# Patient Record
Sex: MALE | Race: WHITE | NOT HISPANIC OR LATINO | URBAN - METROPOLITAN AREA
[De-identification: names, ages, dates, MRNs, and addresses within clinical notes are randomized per-mention and may not be internally consistent; named-entity substitution may affect disease eponyms.]

---

## 2017-11-28 ENCOUNTER — IMPORTED ENCOUNTER (OUTPATIENT)
Dept: URBAN - METROPOLITAN AREA CLINIC 38 | Facility: CLINIC | Age: 72
End: 2017-11-28

## 2017-11-28 PROBLEM — H25.813 COMBINED FORMS OF AGE-RELATED CATARACT, BILATERAL: Noted: 2017-11-28

## 2017-11-28 PROBLEM — E11.9 TYPE II DIABETES WITHOUT COMPLICATIONS: Noted: 2017-11-28

## 2017-11-28 PROBLEM — H04.123 TEAR FILM INSUFFICIENCY OF BILATERAL LACRIMAL GLANDS: Noted: 2017-11-28

## 2017-11-28 PROBLEM — H40.013 OPEN ANGLE WITH BORDERLINE FINDINGS, LOW RISK, BILATERAL: Noted: 2017-11-28

## 2017-11-28 PROCEDURE — 76514 ECHO EXAM OF EYE THICKNESS: CPT

## 2017-11-28 PROCEDURE — 92014 COMPRE OPH EXAM EST PT 1/>: CPT

## 2018-04-25 ENCOUNTER — IMPORTED ENCOUNTER (OUTPATIENT)
Dept: URBAN - METROPOLITAN AREA CLINIC 38 | Facility: CLINIC | Age: 73
End: 2018-04-25

## 2018-04-25 PROBLEM — H40.033 PRIMARY ANGLE CLOSURE SUSPECT OF BILATERAL EYE: Noted: 2018-04-25

## 2018-04-25 PROBLEM — H25.813 COMBINED FORMS OF AGE-RELATED CATARACT, BILATERAL: Noted: 2018-04-25

## 2018-04-25 PROCEDURE — 92083 EXTENDED VISUAL FIELD XM: CPT

## 2018-04-25 PROCEDURE — 92020 GONIOSCOPY: CPT

## 2018-04-25 PROCEDURE — 92012 INTRM OPH EXAM EST PATIENT: CPT

## 2018-06-15 ENCOUNTER — IMPORTED ENCOUNTER (OUTPATIENT)
Dept: URBAN - METROPOLITAN AREA CLINIC 38 | Facility: CLINIC | Age: 73
End: 2018-06-15

## 2018-06-15 PROBLEM — H40.033 PRIMARY ANGLE CLOSURE SUSPECT OF BILATERAL EYE: Noted: 2018-06-15

## 2018-06-15 PROCEDURE — 66761 REVISION OF IRIS: CPT

## 2018-08-10 ENCOUNTER — IMPORTED ENCOUNTER (OUTPATIENT)
Dept: URBAN - METROPOLITAN AREA CLINIC 38 | Facility: CLINIC | Age: 73
End: 2018-08-10

## 2018-08-10 PROBLEM — H40.033 PRIMARY ANGLE CLOSURE SUSPECT OF BILATERAL EYE: Noted: 2018-08-10

## 2018-08-10 PROCEDURE — 66761 REVISION OF IRIS: CPT

## 2018-11-20 ENCOUNTER — IMPORTED ENCOUNTER (OUTPATIENT)
Dept: URBAN - METROPOLITAN AREA CLINIC 38 | Facility: CLINIC | Age: 73
End: 2018-11-20

## 2018-11-20 PROBLEM — H25.813 COMBINED FORMS OF AGE-RELATED CATARACT, BILATERAL: Noted: 2018-11-20

## 2018-11-20 PROBLEM — H43.813 VITREOUS DEGENERATION, BILATERAL: Noted: 2018-11-20

## 2018-11-20 PROBLEM — E11.3292 TYPE 2 DIABETES WITH MILD NONPROLIFERATIVE DIABETIC RETINOPATHY WITHOUT MACULAR EDEMA OF LEFT EYE: Noted: 2018-11-20

## 2018-11-20 PROBLEM — H40.013 OPEN ANGLE WITH BORDERLINE FINDINGS, LOW RISK, BILATERAL: Noted: 2018-11-20

## 2018-11-20 PROCEDURE — 92014 COMPRE OPH EXAM EST PT 1/>: CPT

## 2018-12-04 ENCOUNTER — IMPORTED ENCOUNTER (OUTPATIENT)
Dept: URBAN - METROPOLITAN AREA CLINIC 38 | Facility: CLINIC | Age: 73
End: 2018-12-04

## 2018-12-04 PROBLEM — H25.813 COMBINED FORMS OF AGE-RELATED CATARACT, BILATERAL: Noted: 2018-12-04

## 2018-12-04 PROCEDURE — 92012 INTRM OPH EXAM EST PATIENT: CPT

## 2018-12-04 PROCEDURE — 92136 OPHTHALMIC BIOMETRY: CPT

## 2018-12-17 ENCOUNTER — IMPORTED ENCOUNTER (OUTPATIENT)
Dept: URBAN - METROPOLITAN AREA CLINIC 38 | Facility: CLINIC | Age: 73
End: 2018-12-17

## 2018-12-17 PROBLEM — H25.813 COMBINED FORMS OF AGE-RELATED CATARACT, BILATERAL: Noted: 2018-12-17

## 2018-12-17 PROCEDURE — 92136 OPHTHALMIC BIOMETRY: CPT

## 2019-02-12 ENCOUNTER — IMPORTED ENCOUNTER (OUTPATIENT)
Dept: URBAN - METROPOLITAN AREA CLINIC 38 | Facility: CLINIC | Age: 74
End: 2019-02-12

## 2019-02-12 PROCEDURE — 66984 XCAPSL CTRC RMVL W/O ECP: CPT

## 2019-02-13 ENCOUNTER — IMPORTED ENCOUNTER (OUTPATIENT)
Dept: URBAN - METROPOLITAN AREA CLINIC 38 | Facility: CLINIC | Age: 74
End: 2019-02-13

## 2019-02-25 ENCOUNTER — IMPORTED ENCOUNTER (OUTPATIENT)
Dept: URBAN - METROPOLITAN AREA CLINIC 38 | Facility: CLINIC | Age: 74
End: 2019-02-25

## 2019-06-04 ENCOUNTER — IMPORTED ENCOUNTER (OUTPATIENT)
Dept: URBAN - METROPOLITAN AREA CLINIC 38 | Facility: CLINIC | Age: 74
End: 2019-06-04

## 2019-06-04 PROBLEM — H43.813 VITREOUS DEGENERATION, BILATERAL: Noted: 2019-06-04

## 2019-06-04 PROBLEM — H26.492 POSTERIOR CAPSULE OPACITY -   OS: Noted: 2019-06-04

## 2019-06-04 PROBLEM — H25.811 COMBINED FORMS OF AGE-RELATED CATARACT, RIGHT EYE: Noted: 2019-06-04

## 2019-06-04 PROBLEM — H40.013 OPEN ANGLE WITH BORDERLINE FINDINGS, LOW RISK, BILATERAL: Noted: 2019-06-04

## 2019-06-04 PROBLEM — E11.3292 TYPE 2 DIABETES WITH MILD NONPROLIFERATIVE DIABETIC RETINOPATHY WITHOUT MACULAR EDEMA OF LEFT EYE: Noted: 2019-06-04

## 2019-06-04 PROCEDURE — 92012 INTRM OPH EXAM EST PATIENT: CPT

## 2019-09-12 ENCOUNTER — IMPORTED ENCOUNTER (OUTPATIENT)
Dept: URBAN - METROPOLITAN AREA CLINIC 38 | Facility: CLINIC | Age: 74
End: 2019-09-12

## 2019-09-12 PROBLEM — E11.9 TYPE II DIABETES WITHOUT COMPLICATIONS: Noted: 2019-09-12

## 2019-09-12 PROBLEM — H25.11 NUCLEAR SCLEROSIS CATARACT OF RIGHT EYE: Noted: 2019-09-12

## 2019-09-12 PROBLEM — H43.813 VITREOUS DEGENERATION, BILATERAL: Noted: 2019-09-12

## 2019-09-12 PROBLEM — H26.492 POSTERIOR CAPSULE OPACITY -   OS: Noted: 2019-09-12

## 2019-09-12 PROCEDURE — 92012 INTRM OPH EXAM EST PATIENT: CPT

## 2019-12-04 ENCOUNTER — IMPORTED ENCOUNTER (OUTPATIENT)
Dept: URBAN - METROPOLITAN AREA CLINIC 38 | Facility: CLINIC | Age: 74
End: 2019-12-04

## 2019-12-04 PROBLEM — H40.013 OPEN ANGLE WITH BORDERLINE FINDINGS, LOW RISK, BILATERAL: Noted: 2019-12-04

## 2019-12-04 PROCEDURE — 92012 INTRM OPH EXAM EST PATIENT: CPT

## 2020-01-15 ENCOUNTER — IMPORTED ENCOUNTER (OUTPATIENT)
Dept: URBAN - METROPOLITAN AREA CLINIC 38 | Facility: CLINIC | Age: 75
End: 2020-01-15

## 2020-01-15 PROBLEM — H40.013 OPEN ANGLE WITH BORDERLINE FINDINGS, LOW RISK, BILATERAL: Noted: 2020-01-15

## 2020-01-15 PROCEDURE — 92012 INTRM OPH EXAM EST PATIENT: CPT

## 2020-01-15 PROCEDURE — 92083 EXTENDED VISUAL FIELD XM: CPT

## 2020-06-09 ENCOUNTER — IMPORTED ENCOUNTER (OUTPATIENT)
Dept: URBAN - METROPOLITAN AREA CLINIC 38 | Facility: CLINIC | Age: 75
End: 2020-06-09

## 2020-09-22 ENCOUNTER — IMPORTED ENCOUNTER (OUTPATIENT)
Dept: URBAN - METROPOLITAN AREA CLINIC 38 | Facility: CLINIC | Age: 75
End: 2020-09-22

## 2020-09-22 PROBLEM — E11.3291 TYPE 2 DIABETES W/ MILD NONPROLIFERATIVE DIABETIC RETINOPATHY W/O MACULAR EDEMA OF RIGHT EYE: Noted: 2020-09-22

## 2020-09-22 PROBLEM — H26.492 POSTERIOR CAPSULE OPACITY -   OS: Noted: 2020-09-22

## 2020-09-22 PROBLEM — H25.811 COMBINED FORMS OF AGE-RELATED CATARACT, RIGHT EYE: Noted: 2020-09-22

## 2020-09-22 PROBLEM — H40.013 OPEN ANGLE WITH BORDERLINE FINDINGS, LOW RISK, BILATERAL: Noted: 2020-09-22

## 2020-09-22 PROCEDURE — 92015 DETERMINE REFRACTIVE STATE: CPT

## 2020-09-22 PROCEDURE — 92014 COMPRE OPH EXAM EST PT 1/>: CPT

## 2020-09-22 PROCEDURE — 92020 GONIOSCOPY: CPT

## 2021-05-05 ENCOUNTER — IMPORTED ENCOUNTER (OUTPATIENT)
Dept: URBAN - METROPOLITAN AREA CLINIC 38 | Facility: CLINIC | Age: 76
End: 2021-05-05

## 2021-05-05 PROBLEM — G43.809 OTHER MIGRAINE, NOT INTRACTABLE, W/O STATUS MIGRAINOSUS: Noted: 2021-05-05

## 2021-05-05 PROBLEM — H40.013 OPEN ANGLE WITH BORDERLINE FINDINGS, LOW RISK, BILATERAL: Noted: 2021-05-05

## 2021-05-05 PROCEDURE — 92012 INTRM OPH EXAM EST PATIENT: CPT

## 2021-05-05 PROCEDURE — 92020 GONIOSCOPY: CPT

## 2021-05-05 PROCEDURE — 92083 EXTENDED VISUAL FIELD XM: CPT

## 2021-06-10 ENCOUNTER — OFFICE VISIT (OUTPATIENT)
Dept: ORTHOPEDICS | Facility: CLINIC | Age: 76
End: 2021-06-10
Payer: COMMERCIAL

## 2021-06-10 VITALS — BODY MASS INDEX: 25.33 KG/M2 | WEIGHT: 208 LBS | HEIGHT: 76 IN

## 2021-06-10 DIAGNOSIS — M47.816 LUMBAR FACET ARTHROPATHY: ICD-10-CM

## 2021-06-10 DIAGNOSIS — M54.16 LUMBAR RADICULOPATHY: ICD-10-CM

## 2021-06-10 DIAGNOSIS — M48.062 LUMBAR STENOSIS WITH NEUROGENIC CLAUDICATION: Primary | ICD-10-CM

## 2021-06-10 PROCEDURE — 3008F BODY MASS INDEX DOCD: CPT | Performed by: ORTHOPAEDIC SURGERY

## 2021-06-10 PROCEDURE — 99204 OFFICE O/P NEW MOD 45 MIN: CPT | Performed by: ORTHOPAEDIC SURGERY

## 2021-06-10 NOTE — PROGRESS NOTES
"ORTHOSPINE H&P  Admitting Diagnosis:  No admission diagnoses are documented for this encounter.      CHIEF COMPLAINT : 1.  Right buttock pain  2.  Right lateral leg pain  3.  Right lateral extremity numbness and weakness  4.  Right foot pain    HPI :     Patient is a 75 y.o. male who presents with diffuse symptoms predominantly right-sided 2 to 3 years duration present before his recent hip replacement of 10 weeks ago.    Has had no treatment to date.    Reports previous lumbar surgery L4-L5 April 2013 right SI joint fusion August 2015.    Symptoms reported to be aching.  Pain diagram currently supports objective complaints.    Describe 6% pain in the leg 40% low back.    Pain is worse with standing sitting and walking.    Ambulatory capacity half mile.    Pain is improved when he lies down.    Uses a cane for ambulation.     Medical History: No past medical history on file.    Surgical History: No past surgical history on file.    Social History:   Social History     Social History Narrative   • Not on file       Family History: No family history on file.    Allergies: Patient has no known allergies.      Review of Systems  All other systems reviewed and negative except as noted in the HPI.    Objective       Physical Exam :   Visit Vitals  Ht 1.93 m (6' 4\")   Wt 94.3 kg (208 lb)   BMI 25.32 kg/m²     Alert and oriented to person time and place.  Posture is neutral.  Gait is with use of a cane.  Neurologic exam is nonfocal.      Imaging : Personal review MRI scan lumbar spine notes previous laminectomy surgery L4-5 no instability is noted.  Adjacent segment stenosis L2-3 L3-L4 related to posterior element hypertrophic changes most notably facet joint arthropathy bilaterally at the L2-L3 L3-L4 level.  No coronal or sagittal instability is noted.    MRI report reviewed      IMPRESSION : 1.  Lumbar spinal stenosis L2-3 L3-4 with right lower extremity radiculopathy  2.  Lumbar facet arthropathy L2-3 L3-L4  3.  Status " post lumbar laminectomy L4-L5    PLAN : Treatment options reviewed including the role of minimally invasive options by way of a selective nerve root injection as well as physical therapy.    Considering his recent hip replacement surgery of 10 weeks I did discuss the role of a physical therapy course pulmonary only.    He will be reevaluated accordingly to determine his response to physical therapy.    We did discuss the role of a decompressive laminectomy and outlined the risks and benefits in brief very similar to his previous experience at the L4-L5 level.        Lino Fatima MD  6/10/2021  11:01 AM

## 2021-10-11 ENCOUNTER — IMPORTED ENCOUNTER (OUTPATIENT)
Dept: URBAN - METROPOLITAN AREA CLINIC 38 | Facility: CLINIC | Age: 76
End: 2021-10-11

## 2021-10-11 PROBLEM — E11.3293 TYPE 2 DIABETES WITH MILD NONPROLIFERATIVE DIABETIC RETINOPATHY WITHOUT MACULAR EDEMA OF BILATERAL EYES: Noted: 2021-10-11

## 2021-10-11 PROBLEM — H26.492 POSTERIOR CAPSULE OPACITY -   OS: Noted: 2021-10-11

## 2021-10-11 PROBLEM — H25.811 COMBINED FORMS OF AGE-RELATED CATARACT, RIGHT EYE: Noted: 2021-10-11

## 2021-10-11 PROBLEM — H40.013 OPEN ANGLE WITH BORDERLINE FINDINGS, LOW RISK, BILATERAL: Noted: 2021-10-11

## 2021-10-11 PROCEDURE — 92014 COMPRE OPH EXAM EST PT 1/>: CPT

## 2021-10-12 ENCOUNTER — OFFICE VISIT (OUTPATIENT)
Dept: ORTHOPEDICS | Facility: CLINIC | Age: 76
End: 2021-10-12
Payer: COMMERCIAL

## 2021-10-12 VITALS — BODY MASS INDEX: 25.33 KG/M2 | WEIGHT: 208 LBS | HEIGHT: 76 IN

## 2021-10-12 DIAGNOSIS — M47.816 LUMBAR FACET ARTHROPATHY: ICD-10-CM

## 2021-10-12 DIAGNOSIS — M48.061 LUMBAR STENOSIS WITHOUT NEUROGENIC CLAUDICATION: ICD-10-CM

## 2021-10-12 DIAGNOSIS — M51.369 LUMBAR DEGENERATIVE DISC DISEASE: Primary | ICD-10-CM

## 2021-10-12 PROCEDURE — 99214 OFFICE O/P EST MOD 30 MIN: CPT | Performed by: ORTHOPAEDIC SURGERY

## 2021-10-12 PROCEDURE — 3008F BODY MASS INDEX DOCD: CPT | Performed by: ORTHOPAEDIC SURGERY

## 2021-10-12 NOTE — PROGRESS NOTES
"ORTHO SPINE ESTABLISHED PATIENT      Raymond Sanchez       HPI: Returns notes improvement in his complaints in terms of his lower extremity pain complaints which are absent.    Notes predominantly lower back and right buttock pain.    Currently has no neuropathic symptomatology    PHYSICAL EXAM :   Visit Vitals  Ht 1.93 m (6' 4\")   Wt 94.3 kg (208 lb)   BMI 25.32 kg/m²       Posture is neutral.  Neurologic exam is nonfocal.  Provocative radicular testing absent.  Hip and knee range of motion did not elicit any pain complaints      Imaging : Personal view MRI scan lumbar spine notes multilevel lumbar disc disease and multilevel lumbar facet arthropathy with varying degrees of lumbar stenosis moderate to severe at the L4-L5 and moderate at the L3-L4 level.  No associated instability is noted.  Multilevel disc degenerative changes are noted.    MRI report reviewed    IMPRESSION : 1.  Lumbar multilevel disc disease  2.  Multilevel lumbar facet arthropathy  3.  Lumbar spinal stenosis moderate to severe L3-4 L4-5    PLAN : Treatment options were discussed including the role of spine surgery and outcome specific to his presurgical complaints.    At this point based on his clinical presentation subjective improvement I do not feel spinal surgical intervention is a viable option at this point.  I do feel he would be best served with observation rather than surgical intervention.    The specifics of surgery were in fact outlined and he will be followed accordingly pending his response to noninvasive treatment.      Lino Fatima MD   10/12/2021  11:27 AM  "

## 2022-02-07 ENCOUNTER — TELEPHONE (OUTPATIENT)
Dept: INTERNAL MEDICINE | Facility: CLINIC | Age: 77
End: 2022-02-07
Payer: COMMERCIAL

## 2022-04-11 ENCOUNTER — IMPORTED ENCOUNTER (OUTPATIENT)
Dept: URBAN - METROPOLITAN AREA CLINIC 38 | Facility: CLINIC | Age: 77
End: 2022-04-11

## 2022-04-11 PROBLEM — H40.013: Noted: 2022-04-11

## 2022-04-11 PROBLEM — H40.013 GLAUCOMA SUSPECT: Noted: 2022-04-11

## 2022-04-11 PROBLEM — H40.013 OPEN ANGLE WITH BORDERLINE FINDINGS, LOW RISK, BILATERAL: Noted: 2022-04-11

## 2022-04-11 PROCEDURE — 92020 GONIOSCOPY: CPT

## 2022-04-11 PROCEDURE — 92083 EXTENDED VISUAL FIELD XM: CPT

## 2022-04-11 PROCEDURE — 92012 INTRM OPH EXAM EST PATIENT: CPT

## 2022-06-04 ASSESSMENT — TONOMETRY
OS_IOP_MMHG: 10
OD_IOP_MMHG: 11
OS_IOP_MMHG: 9
OS_IOP_MMHG: 12
OS_IOP_MMHG: 15
OD_IOP_MMHG: 15
OD_IOP_MMHG: 16
OS_IOP_MMHG: 14
OD_IOP_MMHG: 15
OD_IOP_MMHG: 18
OD_IOP_MMHG: 10
OS_IOP_MMHG: 19
OS_IOP_MMHG: 13
OD_IOP_MMHG: 9
OS_IOP_MMHG: 15
OD_IOP_MMHG: 9
OD_IOP_MMHG: 17
OD_IOP_MMHG: 14
OD_IOP_MMHG: 11
OD_IOP_MMHG: 12
OD_IOP_MMHG: 9
OS_IOP_MMHG: 9
OS_IOP_MMHG: 11
OD_IOP_MMHG: 13
OS_IOP_MMHG: 12
OS_IOP_MMHG: 19
OS_IOP_MMHG: 7
OD_IOP_MMHG: 13
OS_IOP_MMHG: 8
OD_IOP_MMHG: 8
OD_IOP_MMHG: 10
OD_IOP_MMHG: 10
OS_IOP_MMHG: 10
OD_IOP_MMHG: 9
OD_IOP_MMHG: 13
OS_IOP_MMHG: 9
OS_IOP_MMHG: 14
OS_IOP_MMHG: 10
OS_IOP_MMHG: 7
OD_IOP_MMHG: 12
OS_IOP_MMHG: 13
OS_IOP_MMHG: 9
OD_IOP_MMHG: 14
OD_IOP_MMHG: 10
OD_IOP_MMHG: 11
OS_IOP_MMHG: 10
OS_IOP_MMHG: 9
OS_IOP_MMHG: 11
OS_IOP_MMHG: 9
OD_IOP_MMHG: 13

## 2022-06-04 ASSESSMENT — KERATOMETRY
OS_AXISANGLE_DEGREES: 170
OS_K2POWER_DIOPTERS: 45.00
OD_K2POWER_DIOPTERS: 43.25
OD_K1POWER_DIOPTERS: 44.75
OD_K2POWER_DIOPTERS: 43.00
OS_K1POWER_DIOPTERS: 42.75
OD_K2POWER_DIOPTERS: 45.25
OS_AXISANGLE_DEGREES: 180
OD_K1POWER_DIOPTERS: 43.00
OS_AXISANGLE2_DEGREES: 180
OD_AXISANGLE2_DEGREES: 85
OD_AXISANGLE_DEGREES: 80
OS_AXISANGLE2_DEGREES: 90
OD_K1POWER_DIOPTERS: 45.00
OS_K1POWER_DIOPTERS: 45.25
OD_K1POWER_DIOPTERS: 43.00
OS_AXISANGLE_DEGREES: 180
OD_K1POWER_DIOPTERS: 45.25
OS_K2POWER_DIOPTERS: 45.00
OS_AXISANGLE_DEGREES: 86
OD_AXISANGLE_DEGREES: 77
OD_AXISANGLE_DEGREES: 170
OD_AXISANGLE_DEGREES: 165
OS_AXISANGLE2_DEGREES: 85
OS_K2POWER_DIOPTERS: 43.50
OS_AXISANGLE2_DEGREES: 110
OD_AXISANGLE2_DEGREES: 75
OD_AXISANGLE_DEGREES: 165
OS_AXISANGLE_DEGREES: 180
OS_AXISANGLE_DEGREES: 179
OS_AXISANGLE_DEGREES: 5
OS_K2POWER_DIOPTERS: 44.75
OD_K1POWER_DIOPTERS: 45.00
OS_K2POWER_DIOPTERS: 43.25
OD_AXISANGLE2_DEGREES: 166
OS_K2POWER_DIOPTERS: 43.50
OS_AXISANGLE2_DEGREES: 90
OD_AXISANGLE2_DEGREES: 80
OS_AXISANGLE2_DEGREES: 90
OS_AXISANGLE2_DEGREES: 85
OD_K2POWER_DIOPTERS: 45.00
OD_K1POWER_DIOPTERS: 43.00
OD_K2POWER_DIOPTERS: 43.25
OS_K2POWER_DIOPTERS: 44.25
OD_AXISANGLE2_DEGREES: 167
OS_AXISANGLE_DEGREES: 83
OS_AXISANGLE_DEGREES: 90
OS_AXISANGLE2_DEGREES: 180
OS_AXISANGLE_DEGREES: 175
OD_K2POWER_DIOPTERS: 43.00
OD_AXISANGLE_DEGREES: 165
OD_AXISANGLE_DEGREES: 175
OD_AXISANGLE2_DEGREES: 75
OS_K1POWER_DIOPTERS: 42.50
OD_K2POWER_DIOPTERS: 43.50
OS_K2POWER_DIOPTERS: 43.50
OD_AXISANGLE2_DEGREES: 168
OS_K1POWER_DIOPTERS: 44.50
OS_K1POWER_DIOPTERS: 44.75
OS_K1POWER_DIOPTERS: 44.50
OS_K1POWER_DIOPTERS: 44.75
OD_AXISANGLE_DEGREES: 76
OD_K2POWER_DIOPTERS: 43.50
OD_K1POWER_DIOPTERS: 45.00
OS_K1POWER_DIOPTERS: 42.25
OD_AXISANGLE2_DEGREES: 75
OS_K2POWER_DIOPTERS: 43.50
OS_AXISANGLE2_DEGREES: 176
OS_K1POWER_DIOPTERS: 45.00
OD_K1POWER_DIOPTERS: 45.25
OD_AXISANGLE_DEGREES: 165
OS_AXISANGLE_DEGREES: 90
OD_K1POWER_DIOPTERS: 44.75
OS_AXISANGLE2_DEGREES: 95
OS_AXISANGLE2_DEGREES: 89
OD_K2POWER_DIOPTERS: 45.00
OS_K2POWER_DIOPTERS: 44.00
OD_K2POWER_DIOPTERS: 43.25
OD_AXISANGLE2_DEGREES: 70
OS_K2POWER_DIOPTERS: 43.50
OS_K1POWER_DIOPTERS: 44.75
OD_K1POWER_DIOPTERS: 43.00
OS_AXISANGLE_DEGREES: 175
OD_AXISANGLE_DEGREES: 160
OD_AXISANGLE_DEGREES: 78
OS_AXISANGLE2_DEGREES: 80
OS_K1POWER_DIOPTERS: 44.50
OD_AXISANGLE2_DEGREES: 75
OD_AXISANGLE2_DEGREES: 75
OS_K2POWER_DIOPTERS: 44.75
OS_AXISANGLE_DEGREES: 20
OS_K1POWER_DIOPTERS: 42.50
OD_K2POWER_DIOPTERS: 45.00
OD_AXISANGLE_DEGREES: 165
OD_K1POWER_DIOPTERS: 44.75
OS_AXISANGLE2_DEGREES: 173
OD_K2POWER_DIOPTERS: 42.50
OS_K2POWER_DIOPTERS: 44.00
OD_AXISANGLE2_DEGREES: 170
OS_K1POWER_DIOPTERS: 44.75

## 2022-06-04 ASSESSMENT — VISUAL ACUITY
OD_CC: J1
OD_CC: J1
OS_SC: 20/25-1
OD_SC: 20/30
OS_BAT: 20/50-
OS_CC: J1
OS_CC: J1
OD_BAT: 20/30-
OS_SC: 20/30
OS_CC: J1
OS_SC: J3
OS_SC: 20/25
OD_CC: J1
OD_SC: 20/30-
OD_BAT: 20/200
OD_CC: J1
OD_CC: J1
OS_SC: 20/25-1
OD_CC: 20/30
OS_BAT: 20/50-
OS_CC: 20/20
OD_CC: J1
OS_CC: J1
OD_SC: J4
OS_SC: 20/25-1
OD_CC: 20/30
OD_SC: 20/30
OS_SC: 20/30-1
OD_SC: 20/25-1
OD_SC: J7
OD_SC: 20/25-1
OS_CC: J2
OS_SC: 20/25
OS_SC: 20/25
OS_CC: J1
OD_SC: 20/30-1
OS_SC: 20/20-1
OD_CC: 20/30-1
OS_CC: 20/25-1
OD_CC: J1
OD_CC: J1
OS_CC: J1
OD_CC: J1
OS_BAT: 20/30-
OD_BAT: 20/40
OD_CC: J1
OS_CC: J1
OS_SC: 20/25-1
OD_CC: 20/30+2
OS_CC: 20/25-1
OS_CC: 20/25-1
OD_SC: 20/30-1
OD_BAT: 20/50-
OS_BAT: 20/40
OD_SC: 20/40-1
OD_BAT: 20/40-
OS_SC: 20/25-1
OS_SC: J4
OS_CC: J3
OS_BAT: 20/60
OS_CC: J1
OD_SC: 20/30-1
OD_SC: 20/30
OD_SC: 20/25-1
OS_SC: 20/25-1
OS_SC: J3
OD_SC: 20/30

## 2022-06-04 ASSESSMENT — PACHYMETRY
OS_CT_UM: C:  FINAL: 492.000; P:
OD_CT_UM: C:  FINAL: 491.000; P:  IN: 0.000
OD_CT_UM: C:  FINAL: 491.000; P:  IN: 0.000
OS_CT_UM: C:  FINAL: 492.000; P:
OS_CT_UM: C:  FINAL: 492.000; P:
OD_CT_UM: C:  FINAL: 491.000; P:  IN: 0.000
OS_CT_UM: C:  FINAL: 492.000; P:
OD_CT_UM: C:  FINAL: 491.000; P:  IN: 0.000
OS_CT_UM: C:  FINAL: 492.000; P:  IN: 0.000
OD_CT_UM: C:  FINAL: 491.000; P:  IN: 0.000

## 2022-10-10 ENCOUNTER — ESTABLISHED COMPREHENSIVE EXAM (OUTPATIENT)
Dept: URBAN - METROPOLITAN AREA CLINIC 68 | Facility: CLINIC | Age: 77
End: 2022-10-10

## 2022-10-10 DIAGNOSIS — E11.3493: ICD-10-CM

## 2022-10-10 DIAGNOSIS — H40.013: ICD-10-CM

## 2022-10-10 DIAGNOSIS — H04.123: ICD-10-CM

## 2022-10-10 DIAGNOSIS — H25.811: ICD-10-CM

## 2022-10-10 DIAGNOSIS — H26.492: ICD-10-CM

## 2022-10-10 PROCEDURE — 92133 CPTRZD OPH DX IMG PST SGM ON: CPT

## 2022-10-10 PROCEDURE — 92014 COMPRE OPH EXAM EST PT 1/>: CPT

## 2022-10-10 ASSESSMENT — KERATOMETRY
OD_AXISANGLE_DEGREES: 071
OS_K1POWER_DIOPTERS: 42.25
OS_AXISANGLE2_DEGREES: 174
OS_AXISANGLE_DEGREES: 084
OD_K1POWER_DIOPTERS: 43.25
OD_AXISANGLE2_DEGREES: 161
OD_K2POWER_DIOPTERS: 45.00
OS_K2POWER_DIOPTERS: 44.50

## 2022-10-10 ASSESSMENT — VISUAL ACUITY
OD_SC: 20/20
OD_GLARE: 20/40
OS_GLARE: 20/20
OS_CC: J1
OD_CC: J1
OS_SC: 20/20

## 2022-10-10 ASSESSMENT — TONOMETRY
OD_IOP_MMHG: 15
OD_IOP_MMHG: 18
OS_IOP_MMHG: 13
OS_IOP_MMHG: 16

## 2023-04-12 ENCOUNTER — ESTABLISHED COMPREHENSIVE EXAM (OUTPATIENT)
Dept: URBAN - METROPOLITAN AREA CLINIC 68 | Facility: CLINIC | Age: 78
End: 2023-04-12

## 2023-04-12 DIAGNOSIS — H26.492: ICD-10-CM

## 2023-04-12 DIAGNOSIS — H40.013: ICD-10-CM

## 2023-04-12 DIAGNOSIS — H25.811: ICD-10-CM

## 2023-04-12 DIAGNOSIS — E11.3492: ICD-10-CM

## 2023-04-12 PROCEDURE — 92083 EXTENDED VISUAL FIELD XM: CPT

## 2023-04-12 PROCEDURE — 92014 COMPRE OPH EXAM EST PT 1/>: CPT

## 2023-04-12 PROCEDURE — 92020 GONIOSCOPY: CPT

## 2023-04-12 ASSESSMENT — KERATOMETRY
OD_AXISANGLE2_DEGREES: 161
OS_AXISANGLE_DEGREES: 084
OS_AXISANGLE2_DEGREES: 174
OD_AXISANGLE_DEGREES: 071
OS_K1POWER_DIOPTERS: 42.25
OD_K2POWER_DIOPTERS: 45.00
OS_K2POWER_DIOPTERS: 44.50
OD_K1POWER_DIOPTERS: 43.25

## 2023-04-12 ASSESSMENT — VISUAL ACUITY
OD_CC: J1
OD_SC: 20/25
OS_SC: 20/20-1
OD_GLARE: 20/40
OS_GLARE: 20/40-1
OS_CC: J1

## 2023-04-12 ASSESSMENT — TONOMETRY
OD_IOP_MMHG: 8
OS_IOP_MMHG: 12
OD_IOP_MMHG: 16
OS_IOP_MMHG: 8

## 2023-11-13 ENCOUNTER — FOLLOW UP (OUTPATIENT)
Dept: URBAN - METROPOLITAN AREA CLINIC 68 | Facility: CLINIC | Age: 78
End: 2023-11-13

## 2023-11-13 DIAGNOSIS — H40.013: ICD-10-CM

## 2023-11-13 PROCEDURE — 92133 CPTRZD OPH DX IMG PST SGM ON: CPT

## 2023-11-13 PROCEDURE — 92012 INTRM OPH EXAM EST PATIENT: CPT

## 2023-11-13 ASSESSMENT — KERATOMETRY
OD_AXISANGLE_DEGREES: 071
OS_K2POWER_DIOPTERS: 44.50
OS_K1POWER_DIOPTERS: 42.25
OS_AXISANGLE_DEGREES: 084
OD_AXISANGLE2_DEGREES: 161
OS_AXISANGLE2_DEGREES: 174
OD_K1POWER_DIOPTERS: 43.25
OD_K2POWER_DIOPTERS: 45.00

## 2023-11-13 ASSESSMENT — VISUAL ACUITY
OS_SC: 20/25
OD_SC: 20/25-1

## 2023-11-13 ASSESSMENT — TONOMETRY
OD_IOP_MMHG: 13
OD_IOP_MMHG: 10
OS_IOP_MMHG: 13
OS_IOP_MMHG: 10

## 2024-06-17 ENCOUNTER — ESTABLISHED COMPREHENSIVE EXAM (OUTPATIENT)
Dept: URBAN - METROPOLITAN AREA CLINIC 68 | Facility: CLINIC | Age: 79
End: 2024-06-17

## 2024-06-17 DIAGNOSIS — H04.123: ICD-10-CM

## 2024-06-17 DIAGNOSIS — H25.811: ICD-10-CM

## 2024-06-17 DIAGNOSIS — H26.492: ICD-10-CM

## 2024-06-17 DIAGNOSIS — H40.013: ICD-10-CM

## 2024-06-17 PROCEDURE — 92020 GONIOSCOPY: CPT

## 2024-06-17 PROCEDURE — 92014 COMPRE OPH EXAM EST PT 1/>: CPT

## 2024-06-17 PROCEDURE — 92083 EXTENDED VISUAL FIELD XM: CPT

## 2024-06-17 ASSESSMENT — VISUAL ACUITY
OD_SC: 20/30-1
OS_SC: J2
OS_GLARE: 20/40
OD_SC: J1
OS_SC: 20/25-1
OD_GLARE: 20/50

## 2024-06-17 ASSESSMENT — KERATOMETRY
OD_AXISANGLE_DEGREES: 75
OS_AXISANGLE2_DEGREES: 4
OD_K1POWER_DIOPTERS: 43.25
OS_K2POWER_DIOPTERS: 43.75
OD_AXISANGLE2_DEGREES: 165
OS_AXISANGLE_DEGREES: 94
OS_K1POWER_DIOPTERS: 41.25
OD_K2POWER_DIOPTERS: 45.25

## 2024-06-17 ASSESSMENT — TONOMETRY
OS_IOP_MMHG: 16
OS_IOP_MMHG: 11
OD_IOP_MMHG: 19
OD_IOP_MMHG: 10

## 2024-12-16 ENCOUNTER — FOLLOW UP (OUTPATIENT)
Dept: URBAN - METROPOLITAN AREA CLINIC 68 | Facility: CLINIC | Age: 79
End: 2024-12-16

## 2024-12-16 DIAGNOSIS — E11.9: ICD-10-CM

## 2024-12-16 DIAGNOSIS — H40.013: ICD-10-CM

## 2024-12-16 PROCEDURE — 92133 CPTRZD OPH DX IMG PST SGM ON: CPT

## 2024-12-16 PROCEDURE — 99214 OFFICE O/P EST MOD 30 MIN: CPT

## 2024-12-16 ASSESSMENT — VISUAL ACUITY
OS_GLARE: 20/50
OD_PH: 20/20
OD_GLARE: 20/100
OD_SC: 20/80+1
OS_SC: 20/25-2

## 2024-12-16 ASSESSMENT — KERATOMETRY
OD_AXISANGLE2_DEGREES: 165
OD_AXISANGLE_DEGREES: 75
OS_AXISANGLE2_DEGREES: 4
OD_K2POWER_DIOPTERS: 45.25
OS_K1POWER_DIOPTERS: 41.25
OD_K1POWER_DIOPTERS: 43.25
OS_K2POWER_DIOPTERS: 43.75
OS_AXISANGLE_DEGREES: 94

## 2024-12-16 ASSESSMENT — TONOMETRY
OS_IOP_MMHG: 14
OD_IOP_MMHG: 16
OD_IOP_MMHG: 11
OS_IOP_MMHG: 11

## 2025-01-24 ENCOUNTER — CONSULTATION (OUTPATIENT)
Dept: URBAN - METROPOLITAN AREA CLINIC 68 | Facility: CLINIC | Age: 80
End: 2025-01-24

## 2025-01-24 DIAGNOSIS — H25.811: ICD-10-CM

## 2025-01-24 DIAGNOSIS — H26.492: ICD-10-CM

## 2025-01-24 PROCEDURE — 99214 OFFICE O/P EST MOD 30 MIN: CPT

## 2025-01-24 PROCEDURE — 92136 OPHTHALMIC BIOMETRY: CPT

## 2025-01-24 PROCEDURE — 92134 CPTRZ OPH DX IMG PST SGM RTA: CPT | Mod: NC

## 2025-01-24 PROCEDURE — 92025 CPTRIZED CORNEAL TOPOGRAPHY: CPT | Mod: NC

## 2025-01-24 ASSESSMENT — KERATOMETRY
OS_K1POWER_DIOPTERS: 41.25
OS_K2POWER_DIOPTERS: 43.75
OS_AXISANGLE2_DEGREES: 4
OD_K2POWER_DIOPTERS: 45.25
OD_K1POWER_DIOPTERS: 43.25
OS_AXISANGLE_DEGREES: 94
OD_AXISANGLE_DEGREES: 75
OD_AXISANGLE2_DEGREES: 165

## 2025-01-24 ASSESSMENT — VISUAL ACUITY
OD_PH: 20/20
OS_SC: 20/25
OD_GLARE: 20/100
OD_SC: J1
OD_SC: 20/70

## 2025-01-24 ASSESSMENT — TONOMETRY
OD_IOP_MMHG: 11
OS_IOP_MMHG: 10

## 2025-04-30 ENCOUNTER — 1 DAY POST-OP (OUTPATIENT)
Dept: URBAN - METROPOLITAN AREA CLINIC 68 | Facility: CLINIC | Age: 80
End: 2025-04-30

## 2025-04-30 DIAGNOSIS — Z96.1: ICD-10-CM

## 2025-04-30 PROCEDURE — 99024 POSTOP FOLLOW-UP VISIT: CPT

## 2025-04-30 ASSESSMENT — KERATOMETRY
OS_AXISANGLE2_DEGREES: 4
OS_K1POWER_DIOPTERS: 41.25
OD_K1POWER_DIOPTERS: 43.25
OS_AXISANGLE_DEGREES: 94
OS_K2POWER_DIOPTERS: 43.75
OD_AXISANGLE_DEGREES: 75
OD_AXISANGLE2_DEGREES: 165
OD_K2POWER_DIOPTERS: 45.25

## 2025-04-30 ASSESSMENT — TONOMETRY
OD_IOP_MMHG: 18
OD_IOP_MMHG: 13
OS_IOP_MMHG: 11

## 2025-04-30 ASSESSMENT — VISUAL ACUITY
OD_SC: 20/20
OS_SC: 20/25+1

## 2025-05-14 ENCOUNTER — 2 WEEK POST-OP (OUTPATIENT)
Dept: URBAN - METROPOLITAN AREA CLINIC 68 | Facility: CLINIC | Age: 80
End: 2025-05-14

## 2025-05-14 DIAGNOSIS — Z96.1: ICD-10-CM

## 2025-05-14 PROCEDURE — 99024 POSTOP FOLLOW-UP VISIT: CPT

## 2025-05-14 ASSESSMENT — VISUAL ACUITY
OS_SC: 20/30-1
OD_SC: 20/30
OS_GLARE: 20/50

## 2025-05-14 ASSESSMENT — TONOMETRY
OS_IOP_MMHG: 12
OD_IOP_MMHG: 10

## 2025-05-14 ASSESSMENT — KERATOMETRY
OS_AXISANGLE_DEGREES: 94
OD_AXISANGLE2_DEGREES: 165
OD_K1POWER_DIOPTERS: 43.25
OS_AXISANGLE2_DEGREES: 4
OS_K2POWER_DIOPTERS: 43.75
OS_K1POWER_DIOPTERS: 41.25
OD_AXISANGLE_DEGREES: 75
OD_K2POWER_DIOPTERS: 45.25

## 2025-08-13 ENCOUNTER — PROCEDURE ONLY (OUTPATIENT)
Dept: URBAN - METROPOLITAN AREA CLINIC 68 | Facility: CLINIC | Age: 80
End: 2025-08-13

## 2025-08-13 DIAGNOSIS — H26.492: ICD-10-CM

## 2025-08-13 DIAGNOSIS — H43.813: ICD-10-CM

## 2025-08-13 PROCEDURE — 66821 AFTER CATARACT LASER SURGERY: CPT

## 2025-08-13 PROCEDURE — 92012 INTRM OPH EXAM EST PATIENT: CPT | Mod: 25

## 2025-08-13 ASSESSMENT — KERATOMETRY
OS_AXISANGLE_DEGREES: 94
OS_AXISANGLE2_DEGREES: 4
OD_K1POWER_DIOPTERS: 43.25
OD_AXISANGLE2_DEGREES: 165
OD_AXISANGLE_DEGREES: 75
OS_K2POWER_DIOPTERS: 43.75
OD_K2POWER_DIOPTERS: 45.25
OS_K1POWER_DIOPTERS: 41.25

## 2025-08-13 ASSESSMENT — VISUAL ACUITY
OS_GLARE: 20/50
OD_SC: J5
OS_SC: 20/20
OD_SC: 20/40-1
OD_PH: 20/20
OS_SC: J1

## 2025-08-13 ASSESSMENT — TONOMETRY
OS_IOP_MMHG: 10
OD_IOP_MMHG: 10

## 2025-08-21 ENCOUNTER — CONSULTATION (OUTPATIENT)
Dept: URBAN - METROPOLITAN AREA CLINIC 68 | Facility: CLINIC | Age: 80
End: 2025-08-21

## 2025-08-21 VITALS — HEART RATE: 63 BPM | HEIGHT: 60 IN | SYSTOLIC BLOOD PRESSURE: 128 MMHG | DIASTOLIC BLOOD PRESSURE: 70 MMHG

## 2025-08-21 DIAGNOSIS — H35.371: ICD-10-CM

## 2025-08-21 DIAGNOSIS — E11.3293: ICD-10-CM

## 2025-08-21 PROCEDURE — 99214 OFFICE O/P EST MOD 30 MIN: CPT | Mod: 24

## 2025-08-21 PROCEDURE — 92134 CPTRZ OPH DX IMG PST SGM RTA: CPT

## 2025-08-21 PROCEDURE — 92250 FUNDUS PHOTOGRAPHY W/I&R: CPT | Mod: NC

## 2025-08-21 ASSESSMENT — KERATOMETRY
OD_K2POWER_DIOPTERS: 45.25
OD_AXISANGLE2_DEGREES: 165
OD_AXISANGLE_DEGREES: 75
OS_K2POWER_DIOPTERS: 43.75
OD_K1POWER_DIOPTERS: 43.25
OS_AXISANGLE2_DEGREES: 4
OS_K1POWER_DIOPTERS: 41.25
OS_AXISANGLE_DEGREES: 94

## 2025-08-21 ASSESSMENT — VISUAL ACUITY
OD_CC: TF: 20/20-
OS_PH: 20/30-

## 2025-08-21 ASSESSMENT — TONOMETRY
OD_IOP_MMHG: 9
OS_IOP_MMHG: 11

## (undated) RX ORDER — PREDNISOLONE ACETATE 10 MG/ML: 1 SUSPENSION/ DROPS OPHTHALMIC